# Patient Record
Sex: FEMALE | Race: WHITE | NOT HISPANIC OR LATINO | Employment: FULL TIME | URBAN - METROPOLITAN AREA
[De-identification: names, ages, dates, MRNs, and addresses within clinical notes are randomized per-mention and may not be internally consistent; named-entity substitution may affect disease eponyms.]

---

## 2018-06-01 ENCOUNTER — HOSPITAL ENCOUNTER (EMERGENCY)
Facility: OTHER | Age: 28
Discharge: HOME | End: 2018-06-01
Attending: EMERGENCY MEDICINE
Payer: COMMERCIAL

## 2018-06-01 VITALS
HEART RATE: 80 BPM | BODY MASS INDEX: 43.32 KG/M2 | SYSTOLIC BLOOD PRESSURE: 128 MMHG | WEIGHT: 260 LBS | DIASTOLIC BLOOD PRESSURE: 82 MMHG | RESPIRATION RATE: 18 BRPM | OXYGEN SATURATION: 100 % | TEMPERATURE: 98 F | HEIGHT: 65 IN

## 2018-06-01 DIAGNOSIS — M25.579 ANKLE PAIN: ICD-10-CM

## 2018-06-01 DIAGNOSIS — S93.401A SPRAIN OF RIGHT ANKLE, UNSPECIFIED LIGAMENT, INITIAL ENCOUNTER: Primary | ICD-10-CM

## 2018-06-01 DIAGNOSIS — M79.673 FOOT PAIN: ICD-10-CM

## 2018-06-01 LAB
B-HCG UR QL: NEGATIVE
CTP QC/QA: YES

## 2018-06-01 PROCEDURE — 99284 EMERGENCY DEPT VISIT MOD MDM: CPT | Mod: 25

## 2018-06-01 PROCEDURE — 25000003 PHARM REV CODE 250: Performed by: PHYSICIAN ASSISTANT

## 2018-06-01 PROCEDURE — 81025 URINE PREGNANCY TEST: CPT | Performed by: EMERGENCY MEDICINE

## 2018-06-01 RX ORDER — IBUPROFEN 600 MG/1
600 TABLET ORAL EVERY 6 HOURS PRN
Qty: 20 TABLET | Refills: 0 | Status: SHIPPED | OUTPATIENT
Start: 2018-06-01

## 2018-06-01 RX ORDER — HYDROCODONE BITARTRATE AND ACETAMINOPHEN 5; 325 MG/1; MG/1
1 TABLET ORAL
Status: COMPLETED | OUTPATIENT
Start: 2018-06-01 | End: 2018-06-01

## 2018-06-01 RX ADMIN — HYDROCODONE BITARTRATE AND ACETAMINOPHEN 1 TABLET: 5; 325 TABLET ORAL at 06:06

## 2018-06-01 NOTE — ED TRIAGE NOTES
"Pt C/O right foot swelling and pain from falling off a curb today. Pt states " It was fine at first and I walked on it but now I cant". Pt describes ain as sharp shooting and throbbing. Pt denies numbness, tinging, SOB, chest pain. RN noted right foot swelling with rendness but no bruising, +2 DP pulses. Pt ambulatory with limp, respirations even and unlabored, AAo x 4. Pt calm and cooperative.   "

## 2018-06-01 NOTE — ED PROVIDER NOTES
Encounter Date: 6/1/2018       History     Chief Complaint   Patient presents with    Ankle Pain     pt rolled ankle and now with  right outer ankle and top of  foot pain.     Patient is a 28-year-old female with no significant medical history who presents to the emergency department with right ankle and foot pain. Patient states that 1 o'clock today, she stepped off a curb, twisting her ankle.  She reports swelling to the ankle and foot.  She reports 10/10 pain to the right foot with bearing weight.  She denies any other injury. She states she has not taken any medication for her pain.      The history is provided by the patient.     Review of patient's allergies indicates:  No Known Allergies  History reviewed. No pertinent past medical history.  History reviewed. No pertinent surgical history.  History reviewed. No pertinent family history.  Social History   Substance Use Topics    Smoking status: Never Smoker    Smokeless tobacco: Never Used    Alcohol use Yes      Comment: occ     Review of Systems   Constitutional: Negative for activity change, appetite change, chills, fatigue and fever.   HENT: Negative for congestion, ear discharge, ear pain, nosebleeds, postnasal drip, sore throat and trouble swallowing.    Respiratory: Negative for cough and shortness of breath.    Cardiovascular: Negative for chest pain.   Gastrointestinal: Negative for abdominal pain, blood in stool, constipation, diarrhea, nausea and vomiting.   Genitourinary: Negative for dysuria, flank pain and hematuria.   Musculoskeletal: Negative for back pain, neck pain and neck stiffness.        Ankle pain and foot pain   Skin: Negative for rash and wound.   Neurological: Negative for dizziness, syncope, speech difficulty, weakness, light-headedness, numbness and headaches.       Physical Exam     Initial Vitals [06/01/18 1759]   BP Pulse Resp Temp SpO2   135/87 83 18 98 °F (36.7 °C) 97 %      MAP       103         Physical Exam    Nursing  note and vitals reviewed.  Constitutional: She appears well-developed and well-nourished. She is not diaphoretic. She is Obese .  Non-toxic appearance. No distress.   HENT:   Head: Normocephalic.   Right Ear: Hearing and external ear normal.   Left Ear: Hearing and external ear normal.   Nose: Nose normal.   Mouth/Throat: Uvula is midline, oropharynx is clear and moist and mucous membranes are normal. No oropharyngeal exudate.   Eyes: Conjunctivae are normal. Pupils are equal, round, and reactive to light.   Neck: Normal range of motion.   Cardiovascular: Normal rate and regular rhythm.   Pulmonary/Chest: Breath sounds normal.   Abdominal: Soft. Bowel sounds are normal. There is no tenderness.   Musculoskeletal:        Right ankle: She exhibits decreased range of motion, swelling and ecchymosis. She exhibits no deformity and normal pulse. Tenderness. Lateral malleolus and head of 5th metatarsal tenderness found.   Lymphadenopathy:     She has no cervical adenopathy.   Neurological: She is alert and oriented to person, place, and time.   Skin: Skin is warm and dry. Capillary refill takes less than 2 seconds.   Psychiatric: She has a normal mood and affect.         ED Course   Procedures  Labs Reviewed   POCT URINE PREGNANCY         Imaging Results          X-Ray Ankle Complete Right (Final result)  Result time 06/01/18 18:35:30    Final result by Rashi Vergara MD (06/01/18 18:35:30)                 Impression:      1. Edema about the ankle, no convincing acute displaced fracture or dislocation.      Electronically signed by: Rashi Vergara MD  Date:    06/01/2018  Time:    18:35             Narrative:    EXAMINATION:  XR ANKLE COMPLETE 3 VIEW RIGHT    CLINICAL HISTORY:  Pain in unspecified ankle and joints of unspecified foot    TECHNIQUE:  AP, lateral, and oblique images of the right ankle were performed.    COMPARISON:  None    FINDINGS:  Three views.    There is diffuse edema about the ankle and lower leg,  most focally overlying the lateral malleolus.  The ankle mortise is intact.  No acute displaced fracture or dislocation of the ankle.  Please see separately dictated report for details of the foot.                               X-Ray Foot Complete Right (Final result)  Result time 06/01/18 18:34:57    Final result by Je Meek III, MD (06/01/18 18:34:57)                 Impression:      See above      Electronically signed by: Je Meek MD  Date:    06/01/2018  Time:    18:34             Narrative:    EXAMINATION:  XR FOOT COMPLETE 3 VIEW RIGHT    CLINICAL HISTORY:  Pain in unspecified foot    FINDINGS:  No fracture dislocation bone destruction or coalition seen.                                     Medical Decision Making:   Initial Assessment:   Urgent evaluation of a 28-year-old female with no significant medical history who presents to the emergency department with ankle and foot pain.  Patient is afebrile, nontoxic appearing, and hemodynamically stable. On exam, there is bony tenderness of the right lateral malleolus with tenderness over the dorsal aspect of the right foot.  There is swelling and bruising to the right ankle.  Leg is neurovascularly intact. X-rays will be obtained.  Patient will be given pain control.  Clinical Tests:   Radiological Study: Reviewed and Ordered  ED Management:  7:04 PM  X-rays reveal no acute osseous injury. Patient will be placed in Ace wrap and air splint.  She is given crutches.  She is given rice instructions.  She is advised to follow up outpatient with Ortho.  She is advised to return to the emergency department with any worsening symptoms or concerns.  Other:   I have discussed this case with another health care provider.       <> Summary of the Discussion: Dr. Love                      Clinical Impression:   The primary encounter diagnosis was Sprain of right ankle, unspecified ligament, initial encounter. Diagnoses of Foot pain and Ankle pain were also  pertinent to this visit.    X-Ray Foot Complete Right    (Results Pending)   X-Ray Ankle Complete Right    (Results Pending)                              Chloé Collado PA-C  06/01/18 2539